# Patient Record
Sex: MALE | Race: WHITE | NOT HISPANIC OR LATINO | Employment: FULL TIME | URBAN - METROPOLITAN AREA
[De-identification: names, ages, dates, MRNs, and addresses within clinical notes are randomized per-mention and may not be internally consistent; named-entity substitution may affect disease eponyms.]

---

## 2024-06-21 ENCOUNTER — OFFICE VISIT (OUTPATIENT)
Dept: SURGERY | Facility: CLINIC | Age: 34
End: 2024-06-21
Payer: COMMERCIAL

## 2024-06-21 VITALS
SYSTOLIC BLOOD PRESSURE: 123 MMHG | HEART RATE: 81 BPM | WEIGHT: 302 LBS | TEMPERATURE: 99.3 F | DIASTOLIC BLOOD PRESSURE: 85 MMHG | HEIGHT: 72 IN | BODY MASS INDEX: 40.9 KG/M2 | OXYGEN SATURATION: 97 %

## 2024-06-21 DIAGNOSIS — L72.3 SEBACEOUS CYST: Primary | ICD-10-CM

## 2024-06-21 PROCEDURE — 99203 OFFICE O/P NEW LOW 30 MIN: CPT | Performed by: SURGERY

## 2024-06-27 ENCOUNTER — TELEPHONE (OUTPATIENT)
Dept: SURGERY | Facility: CLINIC | Age: 34
End: 2024-06-27

## 2024-06-27 NOTE — TELEPHONE ENCOUNTER
The [facility] received a fax that requires your attention. The document has been indexed to the patient’s chart for your review.      Reason for sending: NEEDS REVIEW    Documents Description: MEDICAL RECORDS REQUEST    Name of Sender: [facility]    Date Indexed: 06/27/24

## 2024-07-02 NOTE — PROGRESS NOTES
"CHIEF COMPLAINT:    Cyst on left neck    HISTORY OF PRESENT ILLNESS:    Thaddeus Palacios is a 33 y.o. male who is referred today for a cyst on his left neck.  He states that he had had a small cyst in the area for quite some time but around Memorial Day the area became swollen and somewhat sore.  He saw his PCP and was treated with antibiotics.  An ultrasound was performed showing what appeared to be a sebaceous cyst.  He states since that time the area has spontaneously drained a little bit and has essentially completely resolved at this point.    Past Medical History:   Diagnosis Date    History of MRSA infection        History reviewed. No pertinent surgical history.    Prior to Admission medications    Not on File       No Known Allergies    History reviewed. No pertinent family history.    Social History     Socioeconomic History    Marital status: Single   Tobacco Use    Smoking status: Never     Passive exposure: Past    Smokeless tobacco: Never   Vaping Use    Vaping status: Never Used       Review of Systems   Skin:         Cyst on neck       Objective     /85 (BP Location: Right arm, Patient Position: Sitting, Cuff Size: Large Adult)   Pulse 81   Temp 99.3 °F (37.4 °C) (Infrared)   Ht 182.9 cm (72\")   Wt (!) 137 kg (302 lb)   SpO2 97%   BMI 40.96 kg/m²     Physical Exam  Constitutional:       General: He is not in acute distress.     Appearance: Normal appearance. He is obese. He is not ill-appearing, toxic-appearing or diaphoretic.   HENT:      Head: Normocephalic and atraumatic.   Eyes:      General:         Right eye: No discharge.         Left eye: No discharge.      Extraocular Movements: Extraocular movements intact.      Conjunctiva/sclera: Conjunctivae normal.   Neck:     Pulmonary:      Effort: Pulmonary effort is normal. No respiratory distress.   Skin:     General: Skin is warm.      Coloration: Skin is not jaundiced.   Neurological:      General: No focal deficit present.      Mental " Status: He is alert and oriented to person, place, and time.   Psychiatric:         Mood and Affect: Mood normal.         Behavior: Behavior normal.         Thought Content: Thought content normal.         Judgment: Judgment normal.         DIAGNOSTIC DATA:    Ultrasound report reviewed discussing sebaceous cyst    ASSESSMENT:    Sebaceous cyst on left neck    PLAN:    On exam today the patient has very small residual cyst on the left neck.  I discussed with him possible excision versus continued observation.  Currently he would not like to undergo formal excision of the site.  Certainly if the area begins to reform or grow larger he may return for potential excision.          This document has been electronically signed by Luis Abbott MD on July 2, 2024 10:12 EDT